# Patient Record
(demographics unavailable — no encounter records)

---

## 2024-11-06 NOTE — PHYSICAL EXAM

## 2024-11-06 NOTE — HISTORY OF PRESENT ILLNESS
[Mother] : mother [whole ___ oz/d] : consumes [unfilled] oz of whole cow's milk per day [Fruit] : fruit [Vegetables] : vegetables [Meat] : meat [Grains] : grains [Eggs] : eggs [Fish] : fish [Dairy] : dairy [Normal] : Normal [In own bed] : In own bed [Brushing teeth] : Brushing teeth [Tap water] : Primary Fluoride Source: Tap water [Curiosity about body] : Curiosity about body [Playtime (60 min/d)] : Playtime 60 min a day [< 2 hrs of screen time] : Less than 2 hrs of screen time [Appropiate parent-child communication] : Appropriate parent-child communication [Child given choices] : Child given choices [Child Cooperates] : Child cooperates [No] : Not at  exposure [Water heater temperature set at <120 degrees F] : Water heater temperature set at <120 degrees F [Car seat in back seat] : Car seat in back seat [Carbon Monoxide Detectors] : Carbon monoxide detectors [Smoke Detectors] : Smoke detectors [Supervised outdoor play] : Supervised outdoor play [Influenza] : Influenza [COVID-19] : COVID-19 [NO] : No [Exposure to electronic nicotine delivery system] : No exposure to electronic nicotine delivery system [FreeTextEntry7] : Doing well; Wound infection 3 weeks ago treated with antibiotic [FreeTextEntry9] : [facility] [employer]

## 2024-11-20 NOTE — REASON FOR VISIT
[Routine Follow-Up] : a routine follow-up visit for [Allergy Evaluation/ Skin Testing] : allergy evaluation and or skin testing [Eczema] : eczema [Rash] : rash [Hives] : hives [Mother] : mother

## 2024-11-20 NOTE — PHYSICAL EXAM
[Alert] : alert [Well Nourished] : well nourished [Healthy Appearance] : healthy appearance [No Acute Distress] : no acute distress [Well Developed] : well developed [Normal Pupil & Iris Size/Symmetry] : normal pupil and iris size and symmetry [No Discharge] : no discharge [No Photophobia] : no photophobia [Sclera Not Icteric] : sclera not icteric [Normal TMs] : both tympanic membranes were normal [Normal Nasal Mucosa] : the nasal mucosa was normal [Normal Lips/Tongue] : the lips and tongue were normal [Normal Outer Ear/Nose] : the ears and nose were normal in appearance [Normal Tonsils] : normal tonsils [No Thrush] : no thrush [Pale mucosa] : pale mucosa [Supple] : the neck was supple [Normal Rate and Effort] : normal respiratory rhythm and effort [No Crackles] : no crackles [No Retractions] : no retractions [Bilateral Audible Breath Sounds] : bilateral audible breath sounds [Normal Rate] : heart rate was normal  [Normal S1, S2] : normal S1 and S2 [No murmur] : no murmur [Regular Rhythm] : with a regular rhythm [Soft] : abdomen soft [Not Tender] : non-tender [Not Distended] : not distended [No HSM] : no hepato-splenomegaly [Skin Intact] : skin intact  [No Rash] : no rash [No Skin Lesions] : no skin lesions

## 2024-11-22 NOTE — HISTORY OF PRESENT ILLNESS
[No] : No [de-identified] : Perez is a 4 year old boy with allergic rhinitis, eczema and concern for food allergies who presents for skin testing.  Had an accidental exposure to walnut on carrot cake. Had some mild lip swelling and took Benadryl which resolved his sx.  Eats peanut with no issues and tolerates it. Avoids all tree nuts but used to eat walnuts, almonds.   If pt runs in the cold he has a chesty and deep cough. AC in the car and ice cream all trigger deep cough. No prolonged cough with colds. Sometimes will cough after prolonged activity. Nocturnal cough - had a bout of nightly cough for about 2 weeks. This was 6 weeks ago. No wheezing. Has never tried albuterol. Mom has a hx of asthma. Using HC cream as needed.  No flu shot yet.  Aug 2023:  Patient was in the office last week for an initial allergy and immunology visit. Pt had taken claritin within 1 week of visit, so was instructed to follow up this week for skin testing for peanuts, tree nuts, soy and environmental allergies.   At last visit, family was encouraged to try milk and successfully drank milk this week with no reactions.

## 2024-11-22 NOTE — HISTORY OF PRESENT ILLNESS
[No] : No [de-identified] : Perez is a 4 year old boy with allergic rhinitis, eczema and concern for food allergies who presents for skin testing.  Had an accidental exposure to walnut on carrot cake. Had some mild lip swelling and took Benadryl which resolved his sx.  Eats peanut with no issues and tolerates it. Avoids all tree nuts but used to eat walnuts, almonds.   If pt runs in the cold he has a chesty and deep cough. AC in the car and ice cream all trigger deep cough. No prolonged cough with colds. Sometimes will cough after prolonged activity. Nocturnal cough - had a bout of nightly cough for about 2 weeks. This was 6 weeks ago. No wheezing. Has never tried albuterol. Mom has a hx of asthma. Using HC cream as needed.  No flu shot yet.  Aug 2023:  Patient was in the office last week for an initial allergy and immunology visit. Pt had taken claritin within 1 week of visit, so was instructed to follow up this week for skin testing for peanuts, tree nuts, soy and environmental allergies.   At last visit, family was encouraged to try milk and successfully drank milk this week with no reactions.